# Patient Record
Sex: FEMALE | Race: WHITE | URBAN - METROPOLITAN AREA
[De-identification: names, ages, dates, MRNs, and addresses within clinical notes are randomized per-mention and may not be internally consistent; named-entity substitution may affect disease eponyms.]

---

## 2017-08-18 ENCOUNTER — EMERGENCY (EMERGENCY)
Facility: HOSPITAL | Age: 27
LOS: 1 days | End: 2017-08-18

## 2018-02-26 ENCOUNTER — EMERGENCY (EMERGENCY)
Facility: HOSPITAL | Age: 28
LOS: 1 days | Discharge: HOME | End: 2018-02-26
Attending: EMERGENCY MEDICINE | Admitting: EMERGENCY MEDICINE
Payer: COMMERCIAL

## 2018-02-26 VITALS
WEIGHT: 130.07 LBS | HEART RATE: 94 BPM | SYSTOLIC BLOOD PRESSURE: 132 MMHG | HEIGHT: 62 IN | TEMPERATURE: 99 F | DIASTOLIC BLOOD PRESSURE: 92 MMHG | RESPIRATION RATE: 16 BRPM | OXYGEN SATURATION: 100 %

## 2018-02-26 PROCEDURE — 70450 CT HEAD/BRAIN W/O DYE: CPT | Mod: 26

## 2018-02-26 PROCEDURE — 99284 EMERGENCY DEPT VISIT MOD MDM: CPT | Mod: 25

## 2018-02-26 PROCEDURE — 70450 CT HEAD/BRAIN W/O DYE: CPT

## 2018-02-26 PROCEDURE — 99284 EMERGENCY DEPT VISIT MOD MDM: CPT

## 2018-02-26 PROCEDURE — 72125 CT NECK SPINE W/O DYE: CPT

## 2018-02-26 PROCEDURE — 72125 CT NECK SPINE W/O DYE: CPT | Mod: 26

## 2018-02-26 RX ORDER — METHOCARBAMOL 500 MG/1
2 TABLET, FILM COATED ORAL
Qty: 30 | Refills: 0 | OUTPATIENT
Start: 2018-02-26 | End: 2018-03-02

## 2018-02-26 RX ORDER — ACETAMINOPHEN 500 MG
650 TABLET ORAL ONCE
Qty: 0 | Refills: 0 | Status: COMPLETED | OUTPATIENT
Start: 2018-02-26 | End: 2018-02-26

## 2018-02-26 RX ORDER — IBUPROFEN 200 MG
600 TABLET ORAL ONCE
Qty: 0 | Refills: 0 | Status: COMPLETED | OUTPATIENT
Start: 2018-02-26 | End: 2018-02-26

## 2018-02-26 RX ORDER — ONDANSETRON 8 MG/1
4 TABLET, FILM COATED ORAL ONCE
Qty: 0 | Refills: 0 | Status: COMPLETED | OUTPATIENT
Start: 2018-02-26 | End: 2018-02-26

## 2018-02-26 RX ORDER — METHOCARBAMOL 500 MG/1
1500 TABLET, FILM COATED ORAL ONCE
Qty: 0 | Refills: 0 | Status: COMPLETED | OUTPATIENT
Start: 2018-02-26 | End: 2018-02-26

## 2018-02-26 RX ORDER — ONDANSETRON 8 MG/1
1 TABLET, FILM COATED ORAL
Qty: 6 | Refills: 0 | OUTPATIENT
Start: 2018-02-26 | End: 2018-02-27

## 2018-02-26 RX ORDER — IBUPROFEN 200 MG
1 TABLET ORAL
Qty: 20 | Refills: 0 | OUTPATIENT
Start: 2018-02-26 | End: 2018-03-02

## 2018-02-26 RX ADMIN — ONDANSETRON 4 MILLIGRAM(S): 8 TABLET, FILM COATED ORAL at 17:15

## 2018-02-26 RX ADMIN — Medication 600 MILLIGRAM(S): at 16:45

## 2018-02-26 RX ADMIN — Medication 650 MILLIGRAM(S): at 17:30

## 2018-02-26 RX ADMIN — METHOCARBAMOL 1500 MILLIGRAM(S): 500 TABLET, FILM COATED ORAL at 17:32
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
## 2018-02-26 NOTE — ED PROVIDER NOTE - PROGRESS NOTE DETAILS
pt improved. ct results reviewed with pt including abnormal. pt given a copy of results. will rx robaxin, ibuprofen and zofran. pt advised on risk of concussion and advised her not to engage in sports/ gym until she follows up with pmd. will refer to spine center and concussion program. pt given head injury precautions. All questions answered and concerns addressed. pt verbalized understanding and agreement with plan and dx. pt advised to follow up with PMD. pt advised to return to ed for worsening symptoms including fever, cp, sob. will dc.
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
## 2019-01-29 ENCOUNTER — EMERGENCY (EMERGENCY)
Facility: HOSPITAL | Age: 29
LOS: 1 days | End: 2019-01-29
Payer: COMMERCIAL

## 2019-01-29 PROCEDURE — 99285 EMERGENCY DEPT VISIT HI MDM: CPT

## 2019-01-29 PROCEDURE — 93970 EXTREMITY STUDY: CPT | Mod: 26
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
## 2024-08-13 NOTE — ED ADULT NURSE NOTE - PAIN: BODY LOCATION
What Type Of Note Output Would You Prefer (Optional)?: Standard Output Hpi Title: Evaluation of Skin Lesions How Severe Are Your Spot(S)?: moderate Have Your Spot(S) Been Treated In The Past?: has not been treated normal... neck

## 2025-05-06 ENCOUNTER — EMERGENCY (EMERGENCY)
Facility: HOSPITAL | Age: 35
LOS: 1 days | End: 2025-05-06
Attending: EMERGENCY MEDICINE
Payer: MEDICARE

## 2025-05-06 VITALS
SYSTOLIC BLOOD PRESSURE: 140 MMHG | OXYGEN SATURATION: 98 % | WEIGHT: 114.64 LBS | RESPIRATION RATE: 20 BRPM | HEART RATE: 58 BPM | HEIGHT: 63 IN | DIASTOLIC BLOOD PRESSURE: 115 MMHG | TEMPERATURE: 98 F

## 2025-05-06 LAB
ALBUMIN SERPL ELPH-MCNC: 4 G/DL — SIGNIFICANT CHANGE UP (ref 3.3–5.2)
ALP SERPL-CCNC: 51 U/L — SIGNIFICANT CHANGE UP (ref 40–120)
ALT FLD-CCNC: 21 U/L — SIGNIFICANT CHANGE UP
AMPHET UR-MCNC: POSITIVE
ANION GAP SERPL CALC-SCNC: 15 MMOL/L — SIGNIFICANT CHANGE UP (ref 5–17)
APAP SERPL-MCNC: <3 UG/ML — LOW (ref 10–26)
APPEARANCE UR: CLEAR — SIGNIFICANT CHANGE UP
AST SERPL-CCNC: 17 U/L — SIGNIFICANT CHANGE UP
BACTERIA # UR AUTO: NEGATIVE /HPF — SIGNIFICANT CHANGE UP
BARBITURATES UR SCN-MCNC: NEGATIVE — SIGNIFICANT CHANGE UP
BENZODIAZ UR-MCNC: POSITIVE
BILIRUB SERPL-MCNC: 0.4 MG/DL — SIGNIFICANT CHANGE UP (ref 0.4–2)
BILIRUB UR-MCNC: NEGATIVE — SIGNIFICANT CHANGE UP
BUN SERPL-MCNC: 10.9 MG/DL — SIGNIFICANT CHANGE UP (ref 8–20)
CALCIUM SERPL-MCNC: 9 MG/DL — SIGNIFICANT CHANGE UP (ref 8.4–10.5)
CAST: 1 /LPF — SIGNIFICANT CHANGE UP (ref 0–4)
CHLORIDE SERPL-SCNC: 99 MMOL/L — SIGNIFICANT CHANGE UP (ref 96–108)
CO2 SERPL-SCNC: 21 MMOL/L — LOW (ref 22–29)
COCAINE METAB.OTHER UR-MCNC: NEGATIVE — SIGNIFICANT CHANGE UP
COLOR SPEC: YELLOW — SIGNIFICANT CHANGE UP
CREAT SERPL-MCNC: 0.61 MG/DL — SIGNIFICANT CHANGE UP (ref 0.5–1.3)
DIFF PNL FLD: NEGATIVE — SIGNIFICANT CHANGE UP
EGFR: 119 ML/MIN/1.73M2 — SIGNIFICANT CHANGE UP
EGFR: 119 ML/MIN/1.73M2 — SIGNIFICANT CHANGE UP
ETHANOL SERPL-MCNC: <10 MG/DL — SIGNIFICANT CHANGE UP (ref 0–9)
FENTANYL UR QL SCN: NEGATIVE — SIGNIFICANT CHANGE UP
GLUCOSE SERPL-MCNC: 93 MG/DL — SIGNIFICANT CHANGE UP (ref 70–99)
GLUCOSE UR QL: NEGATIVE MG/DL — SIGNIFICANT CHANGE UP
KETONES UR-MCNC: ABNORMAL MG/DL
LEUKOCYTE ESTERASE UR-ACNC: NEGATIVE — SIGNIFICANT CHANGE UP
METHADONE UR-MCNC: NEGATIVE — SIGNIFICANT CHANGE UP
NITRITE UR-MCNC: NEGATIVE — SIGNIFICANT CHANGE UP
OPIATES UR-MCNC: NEGATIVE — SIGNIFICANT CHANGE UP
PCP SPEC-MCNC: SIGNIFICANT CHANGE UP
PCP UR-MCNC: NEGATIVE — SIGNIFICANT CHANGE UP
PH UR: 6 — SIGNIFICANT CHANGE UP (ref 5–8)
POTASSIUM SERPL-MCNC: 4.2 MMOL/L — SIGNIFICANT CHANGE UP (ref 3.5–5.3)
POTASSIUM SERPL-SCNC: 4.2 MMOL/L — SIGNIFICANT CHANGE UP (ref 3.5–5.3)
PROT SERPL-MCNC: 6.5 G/DL — LOW (ref 6.6–8.7)
PROT UR-MCNC: SIGNIFICANT CHANGE UP MG/DL
RBC CASTS # UR COMP ASSIST: 1 /HPF — SIGNIFICANT CHANGE UP (ref 0–4)
SALICYLATES SERPL-MCNC: <0.6 MG/DL — LOW (ref 10–20)
SODIUM SERPL-SCNC: 135 MMOL/L — SIGNIFICANT CHANGE UP (ref 135–145)
SP GR SPEC: 1.03 — SIGNIFICANT CHANGE UP (ref 1–1.03)
SQUAMOUS # UR AUTO: 5 /HPF — SIGNIFICANT CHANGE UP (ref 0–5)
THC UR QL: POSITIVE
UROBILINOGEN FLD QL: 1 MG/DL — SIGNIFICANT CHANGE UP (ref 0.2–1)
WBC UR QL: 7 /HPF — HIGH (ref 0–5)

## 2025-05-06 PROCEDURE — 99285 EMERGENCY DEPT VISIT HI MDM: CPT

## 2025-05-06 PROCEDURE — 93010 ELECTROCARDIOGRAM REPORT: CPT

## 2025-05-06 NOTE — ED PROVIDER NOTE - OBJECTIVE STATEMENT
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  Patient states she recently had a child taken away by CPS and was recently incarcerated which is causing her a lot of distress.  Patient states she presents to the ED today because she feels hopeless and needs help.  Patient denies having suicidal or homicidal ideations.  Patient denies hallucinations.  Patient is endorsing no other symptoms at this time. Patient denies SOB, chest pain, fever, chills, nausea, vomiting, diarrhea, constipation, headache, weakness, dizziness, dysuria, hematuria.

## 2025-05-06 NOTE — ED PROVIDER NOTE - ATTENDING CONTRIBUTION TO CARE
34y F w/ hx anxiety/depression, ADHD, melanoma s/p resection; presents for psych eval. Pt says she was recently incarcerated and had her child taken away by CPS. Says she is feeling depressed and wants help. Denies SI/HI. Also reports daily alcohol use; expresses interest in detox. Takes prescribed klonopin and adderall. On my exam pt in no acute distress, no tremors noted. Heart RRR, lungs CTAB. Will medically clear for psych eval.

## 2025-05-06 NOTE — ED PROVIDER NOTE - PHYSICAL EXAMINATION
General: Awake, alert, lying in bed  HEENT: Normocephalic, atraumatic. No scleral icterus or conjunctival injection. EOMI. Pupils 2mm b/l and reactive  Cardiac: RRR, S1/S2 present  Resp: Lungs CTAB. Symmetric chest expansion with inspiration. No accessory muscle use  Abd: Soft, non-tender, non-distended. No guarding, rebound, or rigidity.  Skin: No rashes, abrasions, or lacerations.  Extremities: Palpable DP pulses bilaterally. No LE edema.  Neuro: AO x 4. Moves all extremities symmetrically. Motor strength and sensation grossly intact.  Psych: depressed affect

## 2025-05-06 NOTE — ED PROVIDER NOTE - CLINICAL SUMMARY MEDICAL DECISION MAKING FREE TEXT BOX
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 Pending labs and UA.  Psychiatry consulted for evaluation.

## 2025-05-06 NOTE — ED PROVIDER NOTE - NSICDXPASTMEDICALHX_GEN_ALL_CORE_FT
PAST MEDICAL HISTORY:  ADHD     Anxiety     Major depression     Melanoma with mets to the brain

## 2025-05-06 NOTE — ED ADULT TRIAGE NOTE - CHIEF COMPLAINT QUOTE
Pt arrives to ED c/o alcohol and psych meds withdrawal  - last dose taken yesterday , last drink last night

## 2025-05-07 VITALS
HEART RATE: 63 BPM | SYSTOLIC BLOOD PRESSURE: 105 MMHG | TEMPERATURE: 98 F | RESPIRATION RATE: 19 BRPM | DIASTOLIC BLOOD PRESSURE: 64 MMHG | OXYGEN SATURATION: 98 %

## 2025-05-07 DIAGNOSIS — F32.A DEPRESSION, UNSPECIFIED: ICD-10-CM

## 2025-05-07 DIAGNOSIS — Z98.890 OTHER SPECIFIED POSTPROCEDURAL STATES: Chronic | ICD-10-CM

## 2025-05-07 LAB
BASOPHILS # BLD AUTO: 0.04 K/UL — SIGNIFICANT CHANGE UP (ref 0–0.2)
BASOPHILS NFR BLD AUTO: 0.6 % — SIGNIFICANT CHANGE UP (ref 0–2)
EOSINOPHIL # BLD AUTO: 0.07 K/UL — SIGNIFICANT CHANGE UP (ref 0–0.5)
EOSINOPHIL NFR BLD AUTO: 1 % — SIGNIFICANT CHANGE UP (ref 0–6)
FLUAV AG NPH QL: SIGNIFICANT CHANGE UP
FLUBV AG NPH QL: SIGNIFICANT CHANGE UP
HCT VFR BLD CALC: 34.8 % — SIGNIFICANT CHANGE UP (ref 34.5–45)
HGB BLD-MCNC: 11.6 G/DL — SIGNIFICANT CHANGE UP (ref 11.5–15.5)
IMM GRANULOCYTES # BLD AUTO: 0.01 K/UL — SIGNIFICANT CHANGE UP (ref 0–0.07)
IMM GRANULOCYTES NFR BLD AUTO: 0.1 % — SIGNIFICANT CHANGE UP (ref 0–0.9)
LYMPHOCYTES # BLD AUTO: 3.02 K/UL — SIGNIFICANT CHANGE UP (ref 1–3.3)
LYMPHOCYTES NFR BLD AUTO: 42.4 % — SIGNIFICANT CHANGE UP (ref 13–44)
MCHC RBC-ENTMCNC: 28.5 PG — SIGNIFICANT CHANGE UP (ref 27–34)
MCHC RBC-ENTMCNC: 33.3 G/DL — SIGNIFICANT CHANGE UP (ref 32–36)
MCV RBC AUTO: 85.5 FL — SIGNIFICANT CHANGE UP (ref 80–100)
MONOCYTES # BLD AUTO: 0.52 K/UL — SIGNIFICANT CHANGE UP (ref 0–0.9)
MONOCYTES NFR BLD AUTO: 7.3 % — SIGNIFICANT CHANGE UP (ref 2–14)
NEUTROPHILS # BLD AUTO: 3.47 K/UL — SIGNIFICANT CHANGE UP (ref 1.8–7.4)
NEUTROPHILS NFR BLD AUTO: 48.6 % — SIGNIFICANT CHANGE UP (ref 43–77)
NRBC # BLD AUTO: 0 K/UL — SIGNIFICANT CHANGE UP (ref 0–0)
NRBC # FLD: 0 K/UL — SIGNIFICANT CHANGE UP (ref 0–0)
NRBC BLD AUTO-RTO: 0 /100 WBCS — SIGNIFICANT CHANGE UP (ref 0–0)
PLATELET # BLD AUTO: 212 K/UL — SIGNIFICANT CHANGE UP (ref 150–400)
PMV BLD: 11 FL — SIGNIFICANT CHANGE UP (ref 7–13)
RBC # BLD: 4.07 M/UL — SIGNIFICANT CHANGE UP (ref 3.8–5.2)
RBC # FLD: 13.2 % — SIGNIFICANT CHANGE UP (ref 10.3–14.5)
RSV RNA NPH QL NAA+NON-PROBE: SIGNIFICANT CHANGE UP
SARS-COV-2 RNA SPEC QL NAA+PROBE: SIGNIFICANT CHANGE UP
SOURCE RESPIRATORY: SIGNIFICANT CHANGE UP
WBC # BLD: 7.13 K/UL — SIGNIFICANT CHANGE UP (ref 3.8–10.5)
WBC # FLD AUTO: 7.13 K/UL — SIGNIFICANT CHANGE UP (ref 3.8–10.5)

## 2025-05-07 PROCEDURE — G0378: CPT

## 2025-05-07 PROCEDURE — 87086 URINE CULTURE/COLONY COUNT: CPT

## 2025-05-07 PROCEDURE — 80307 DRUG TEST PRSMV CHEM ANLYZR: CPT

## 2025-05-07 PROCEDURE — 81001 URINALYSIS AUTO W/SCOPE: CPT

## 2025-05-07 PROCEDURE — 85025 COMPLETE CBC W/AUTO DIFF WBC: CPT

## 2025-05-07 PROCEDURE — 99236 HOSP IP/OBS SAME DATE HI 85: CPT

## 2025-05-07 PROCEDURE — 87637 SARSCOV2&INF A&B&RSV AMP PRB: CPT

## 2025-05-07 PROCEDURE — 90792 PSYCH DIAG EVAL W/MED SRVCS: CPT | Mod: 95

## 2025-05-07 PROCEDURE — 80053 COMPREHEN METABOLIC PANEL: CPT

## 2025-05-07 PROCEDURE — 36415 COLL VENOUS BLD VENIPUNCTURE: CPT

## 2025-05-07 PROCEDURE — 99284 EMERGENCY DEPT VISIT MOD MDM: CPT | Mod: 25

## 2025-05-07 PROCEDURE — 93005 ELECTROCARDIOGRAM TRACING: CPT

## 2025-05-07 RX ORDER — CLONAZEPAM 0.5 MG/1
1 TABLET ORAL
Qty: 6 | Refills: 0
Start: 2025-05-07 | End: 2025-05-09

## 2025-05-07 RX ORDER — DEXTROAMPHETAMINE SACCHARATE, AMPHETAMINE ASPARTATE MONOHYDRATE, DEXTROAMPHETAMINE SULFATE AND AMPHETAMINE SULFATE 2.5; 2.5; 2.5; 2.5 MG/1; MG/1; MG/1; MG/1
1 CAPSULE, EXTENDED RELEASE ORAL
Qty: 3 | Refills: 0
Start: 2025-05-07 | End: 2025-05-09

## 2025-05-07 RX ORDER — ESCITALOPRAM OXALATE 20 MG/1
1 TABLET ORAL
Qty: 5 | Refills: 0
Start: 2025-05-07 | End: 2025-05-11

## 2025-05-07 RX ORDER — ESCITALOPRAM OXALATE 20 MG/1
20 TABLET ORAL
Refills: 0 | Status: DISCONTINUED | OUTPATIENT
Start: 2025-05-07 | End: 2025-05-14

## 2025-05-07 RX ORDER — NITROFURANTOIN MACROCRYSTAL 100 MG
1 CAPSULE ORAL
Qty: 10 | Refills: 0
Start: 2025-05-07 | End: 2025-05-11

## 2025-05-07 RX ORDER — DEXTROAMPHETAMINE SACCHARATE, AMPHETAMINE ASPARTATE MONOHYDRATE, DEXTROAMPHETAMINE SULFATE AND AMPHETAMINE SULFATE 2.5; 2.5; 2.5; 2.5 MG/1; MG/1; MG/1; MG/1
20 CAPSULE, EXTENDED RELEASE ORAL
Refills: 0 | Status: COMPLETED | OUTPATIENT
Start: 2025-05-07 | End: 2025-05-14

## 2025-05-07 RX ORDER — CLONAZEPAM 0.5 MG/1
1.5 TABLET ORAL ONCE
Refills: 0 | Status: DISCONTINUED | OUTPATIENT
Start: 2025-05-07 | End: 2025-05-07

## 2025-05-07 RX ORDER — DEXTROAMPHETAMINE SACCHARATE, AMPHETAMINE ASPARTATE MONOHYDRATE, DEXTROAMPHETAMINE SULFATE AND AMPHETAMINE SULFATE 2.5; 2.5; 2.5; 2.5 MG/1; MG/1; MG/1; MG/1
20 CAPSULE, EXTENDED RELEASE ORAL ONCE
Refills: 0 | Status: DISCONTINUED | OUTPATIENT
Start: 2025-05-07 | End: 2025-05-07

## 2025-05-07 RX ORDER — ACETAMINOPHEN 500 MG/5ML
975 LIQUID (ML) ORAL ONCE
Refills: 0 | Status: COMPLETED | OUTPATIENT
Start: 2025-05-07 | End: 2025-05-07

## 2025-05-07 RX ORDER — ESCITALOPRAM OXALATE 20 MG/1
20 TABLET ORAL ONCE
Refills: 0 | Status: COMPLETED | OUTPATIENT
Start: 2025-05-07 | End: 2025-05-07

## 2025-05-07 RX ORDER — LOPERAMIDE HCL 1 MG/7.5ML
2 SOLUTION ORAL DAILY
Refills: 0 | Status: DISCONTINUED | OUTPATIENT
Start: 2025-05-07 | End: 2025-05-14

## 2025-05-07 RX ORDER — CLONAZEPAM 0.5 MG/1
1.5 TABLET ORAL
Refills: 0 | Status: COMPLETED | OUTPATIENT
Start: 2025-05-07 | End: 2025-05-14

## 2025-05-07 RX ADMIN — CLONAZEPAM 1.5 MILLIGRAM(S): 0.5 TABLET ORAL at 18:02

## 2025-05-07 RX ADMIN — DEXTROAMPHETAMINE SACCHARATE, AMPHETAMINE ASPARTATE MONOHYDRATE, DEXTROAMPHETAMINE SULFATE AND AMPHETAMINE SULFATE 20 MILLIGRAM(S): 2.5; 2.5; 2.5; 2.5 CAPSULE, EXTENDED RELEASE ORAL at 18:02

## 2025-05-07 RX ADMIN — Medication 975 MILLIGRAM(S): at 04:19

## 2025-05-07 RX ADMIN — ESCITALOPRAM OXALATE 20 MILLIGRAM(S): 20 TABLET ORAL at 00:44

## 2025-05-07 RX ADMIN — CLONAZEPAM 1.5 MILLIGRAM(S): 0.5 TABLET ORAL at 06:00

## 2025-05-07 RX ADMIN — DEXTROAMPHETAMINE SACCHARATE, AMPHETAMINE ASPARTATE MONOHYDRATE, DEXTROAMPHETAMINE SULFATE AND AMPHETAMINE SULFATE 20 MILLIGRAM(S): 2.5; 2.5; 2.5; 2.5 CAPSULE, EXTENDED RELEASE ORAL at 06:00

## 2025-05-07 RX ADMIN — Medication 975 MILLIGRAM(S): at 06:34

## 2025-05-07 RX ADMIN — ESCITALOPRAM OXALATE 20 MILLIGRAM(S): 20 TABLET ORAL at 08:15

## 2025-05-07 RX ADMIN — Medication 975 MILLIGRAM(S): at 11:08

## 2025-05-07 RX ADMIN — CLONAZEPAM 1.5 MILLIGRAM(S): 0.5 TABLET ORAL at 00:44

## 2025-05-07 NOTE — ED CDU PROVIDER INITIAL DAY NOTE - CLINICAL SUMMARY MEDICAL DECISION MAKING FREE TEXT BOX
34-year-old female with PMH of anxiety, depression, ADHD, alcohol abuse, stage IV melanoma with mets to the brain s/p brain resection x 2 presents to the ED with depressive episode. Medically cleared. Psychiatry consulted for evaluation -- cleared for discharge. Pt requesting to speak with social work regarding housing and possible detox placement. Will monitor or CIWA protocol.

## 2025-05-07 NOTE — ED BEHAVIORAL HEALTH ASSESSMENT NOTE - HPI (INCLUDE ILLNESS QUALITY, SEVERITY, DURATION, TIMING, CONTEXT, MODIFYING FACTORS, ASSOCIATED SIGNS AND SYMPTOMS)
Adderall 40 mg, Klonopin 1 mg TID    No records found in PSYCKES. Pt is a 33 yo F, single, previously living with parents, but was asked to leave last night after violating a stay away OOP parents have against her, has 15 mo son who's currently in the custody of her parents, unemployed, on SSDI, PMH significant for stage IV melanoma with mets to the brain s/p brain resection x 2 and resultant seizure disorder, with PPHx MDD vs Bipolar Disorder, ADHD, denies prior psych admissions, denies pSA, remote hx of SIB by cutting, denies recent physical aggression, +hx of alcohol misuse(states last drink was 1-2 days ago, +hx of MJ use, not connected to outpt psych care, but is prescribed Lexapro 20 mg, Adderall 40 mg, and Klonopin 1 mg TID by her oncologist, who self presents to the ED for depression and requesting connection to rehab.     On assessment, the pt is A&O x4, presents as c    No records found in PSYCKES. Pt is a 33 yo F, single, previously living with parents, but was asked to leave last night after violating a stay away OOP parents have against her, has 15 mo son who's currently in the custody of her parents, unemployed, on SSDI, PMH significant for stage IV melanoma with mets to the brain s/p brain resection x 2 and resultant seizure disorder, with PPHx MDD vs Bipolar Disorder, ADHD, denies prior psych admissions, denies pSA, remote hx of SIB by cutting, denies recent physical aggression, +hx of alcohol misuse(states last drink was 1-2 days ago, +hx of MJ use, not connected to outpt psych care, but is prescribed Lexapro 20 mg, Adderall 40 mg, and Klonopin 1 mg TID by her oncologist, who self presents to the ED for depression and requesting connection to rehab.     On assessment, the pt is A&O x4, presents as hyperverbal, circumstantial, and future-oriented, with no e/o internal preoccupation or agitation, and states she came to the ED in search of inpt detox/rehab for her alcohol misuse and wants to speak with a psychiatrist because she's been feeling depressed. The pt states she's been feeling depressed for months in the context of living with her parents, with whom she has a contentious relationship. However, she states she's been acutely distressed over the last 1-2 days due to recent events, including violating her parents' Stay Away OOP by arriving home intoxicated, being placed under arrest and spending the night in correction last night, and then transferring the custody of her son temporarily over to her parents. The pt states she now has no where to stay as she can't return to her parents' home and decided to come to the ED to receive assistance. The pt states that over the last 1-2 days she's felt more depressed and anxious as she does not want to lose permanent custody of her child, has had disturbed sleep, and feels hopeless. However, she adamantly denies recent SI with intent or plan and cites her child as her main reason to live. The pt states she now wants to enter inpt detox/rehab to address her alcohol use and to be connected with outpt psychiatric care for psychotherapy and med management. The pt otherwise does not report current or recent HI, A/VH, PI, denies recent physical aggression or SIB, and denies access to firearms.     No records found in PSYCKES. 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     On assessment, the pt is A&O x4, presents as hyperverbal, circumstantial, and future-oriented, with no e/o internal preoccupation or agitation, and states she came to the ED in search of inpt detox/rehab for her alcohol misuse and wants to speak with a psychiatrist because she's been feeling depressed. The pt states she's been feeling depressed for months in the context of living with her parents, with whom she has a contentious relationship. However, she states she's been acutely distressed over the last 1-2 days due to recent events, including violating her parents' Stay Away OOP by arriving home intoxicated, being placed under arrest and spending the night in long-term last night, and then transferring the custody of her son temporarily over to her parents. The pt states she now has no where to stay as she can't return to her parents' home and decided to come to the ED to receive assistance. The pt states that over the last 1-2 days she's felt more depressed and anxious as she does not want to lose permanent custody of her child, has had disturbed sleep, and feels hopeless. However, she adamantly denies recent SI with intent or plan and cites her child as her main reason to live. The pt states she now wants to enter inpt detox/rehab to address her alcohol use and to be connected with outpt psychiatric care for psychotherapy and med management. The pt otherwise does not report current or recent HI, A/VH, PI, denies recent physical aggression or SIB, and denies access to firearms.     Writer called and left a VM for pt's mother, Chayo Szymanski([phone]), requesting she call back for additional collateral.    No records found in PSYCKES. Pt is a 33 yo F, single, previously living with parents, but was asked to leave last night after violating a stay away OOP parents have against her, has 15 mo son who's currently in the custody of her parents, unemployed, on SSDI, PMH significant for stage IV melanoma with mets to the brain s/p brain resection x 2 and resultant seizure disorder, with PPHx MDD vs Bipolar Disorder, ADHD, denies prior psych admissions, denies pSA, remote hx of SIB by cutting, denies recent physical aggression, +hx of alcohol misuse(states last drink was 1-2 days ago, +hx of MJ use, not connected to outpt psych care, but is prescribed Lexapro 20 mg, Adderall 40 mg, and Klonopin 1 mg TID by her oncologist, who self presents to the ED for depression and requesting connection to rehab.     On assessment, the pt is A&O x4, presents as euthymic, circumstantial, and future-oriented, with no e/o internal preoccupation or agitation, and states she came to the ED in search of inpt detox/rehab for her alcohol misuse and wants to speak with a psychiatrist because she's been feeling depressed. The pt states she's been feeling depressed for months in the context of living with her parents, with whom she has a contentious relationship. However, she states she's been acutely distressed over the last 1-2 days due to recent events, including violating her parents' Stay Away OOP by arriving home intoxicated, being placed under arrest and spending the night in long term last night, and then transferring the custody of her son temporarily over to her parents. The pt states she now has no where to stay as she can't return to her parents' home and decided to come to the ED to receive assistance. The pt states that over the last 1-2 days she's felt more depressed and anxious as she does not want to lose permanent custody of her child, has had disturbed sleep, and feels hopeless. However, she adamantly denies recent SI with intent or plan and cites her child as her main reason to live. The pt states she now wants to enter inpt detox/rehab to address her alcohol use and to be connected with outpt psychiatric care for psychotherapy and med management. The pt otherwise does not report current or recent HI, A/VH, PI, denies recent physical aggression or SIB, and denies access to firearms.     Writer called and left a VM for pt's mother, Chayo Szymanski([phone]), requesting she call back for additional collateral.    No records found in PSYCKES.

## 2025-05-07 NOTE — ED BEHAVIORAL HEALTH ASSESSMENT NOTE - NS ED BHA DEMOGRAPHICS MEDICAL RECORD REVIEWED CONSENT OBTAINED YN
[facility]  Interdisciplinary Discharge Instructions    Date of Discharge: 11/26/19   Ambulatory: Yes  Time of Discharge: Pending   Transportation: Son  Residence Upon Discharge: Home           Sharps / Valuables Returned: Yes    Discharge Medications: Yes     Discharge Prescriptions: Yes     Instructions given by: RN  Patients's own medication returned: N/A            Written educational materials given: Yes  Satisfaction survey completed: Unknown              I have received instruction in these areas and have had an opportunity to ask questions, understand what has been discussed, and have received a copy of this form.        For therapeutic reasons of current patients and for your continued recovery, visitation is not allowed on this unit for thirty days.      _____________________________________ ________________  Signature of Patient     Date/Time      _____________________________________ ________________  Signature of POA/Guardian/Parent              Date/Time      _____________________________________ ________________  Signature of Caregiver    Date/Time          Medication Drop Box Locations - NO NEEDLES IN MEDICATION DROP BOXES    *Liquid Medications must be in ORIGINAL bottles and securely closed. Please place the   bottle in a sealed plastic bag before depositing it in the box.    *It is preferred that medications remain in their original packaging or prescription bottle.    Cross out your name & address, but DO NOT cover up the name of the medication.      [place] [employer]  [address] [facility] [address] [facility]. [phone]    [place] [employer] [address].  [phone]    [place] [employer] [address] [facility]. [phone]    [place] [employer] [address]. [phone]    [place] [employer] [address] [facility]. [phone]      Where to dispose needles?    [facility]'s  Emergency Department in [place] maintains a free drop off box for needles.  Contaminated sharps must be placed in an approved red, plastic, puncture-proof container or a used plastic laundry detergent bottle labeled \"sharps\" - No larger than 11 x 7.5 inches.       Yes

## 2025-05-07 NOTE — CHART NOTE - NSCHARTNOTEFT_GEN_A_CORE
SWNote: worker spoke with registration staff informed pt states she has  medical insurance ([employer] first ). Staff re interviewed pt , no active insurance at this time. Worker informed SOH (Yen) ,pt will be under self pay (~$550 per day)  . Yumiko: worker spoke with registration staff informed pt states she has  medical insurance ([employer] first ). Staff re interviewed pt , no active insurance at this time. Worker informed [facility]OH (Yen) ,pt will be under self pay (~$550 per day)  . Worker called [facility] no availability , [facility] can accommodate pt needs phone interview. Worker informed pt states she has been at [facility] and "it's a mess" declined to do phone interview. Worker thanked  Sveta informed pt declined service, understood.  Worker offered written resources to pt and to call DSS after hours , pt not sure about DSS either. Requested worker if she could call her mother to inform her about inactive insurance , informed she can speak with her mother  if she wants ,ED phone offered. Pt requested to speak with MD feeling sick and wants medicine. Dr Viveros made aware of the above,will meet with pt . Written resources given to pt (COS, SBIRT /connect).

## 2025-05-07 NOTE — SBIRT NOTE ADULT - NSSBIRTBRIEFINTDET_GEN_A_CORE
results discussed with pt , harmful side effects discussed .Pt aware and receptive to referral to program . Pt interested in info to f/u on her own .

## 2025-05-07 NOTE — ED ADULT NURSE REASSESSMENT NOTE - NS ED NURSE REASSESS COMMENT FT1
Pt's cash returned to pt from security. Pt counted money in front of RN and confirmed amount was correct.

## 2025-05-07 NOTE — CHART NOTE - NSCHARTNOTEFT_GEN_A_CORE
SALVADORNote: worker met with pt , informed about  services ,verbalized understanding. Encouraged to share her substance use . Reportedly , pt drinks 2-3  beers (22oz cans) daily . Pt denies any other substance use . States she can stop on her own, denies drinking more than 3 beers sometimes less ,Pt feels she has her drinking under control "enjoy the Budwe[employer]'s beer  flavor but not to get drunk" ..Pt unsure if she wants inpatient program or outpatient .Worker to call [facility] to obtain info about possible bed availability.

## 2025-05-07 NOTE — SBIRT NOTE ADULT - NSSBIRTDRGPOSREINDET_GEN_A_CORE
Patient requesting results.  Please advise.   encouraged to refrain from illegal drug use ,receptive.

## 2025-05-07 NOTE — ED BEHAVIORAL HEALTH ASSESSMENT NOTE - LEGAL HISTORY
Parents have Stay Away OOP against her in place, which she violated 2 nights ago when she arrived home intoxicated and was subsequently arrested.

## 2025-05-07 NOTE — ED BEHAVIORAL HEALTH ASSESSMENT NOTE - NSSUICPROTFACT_PSY_ALL_CORE
help-seeking/Responsibility to children, family, or others/Identifies reasons for living/Fear of death or the actual act of killing self

## 2025-05-07 NOTE — ED CDU PROVIDER DISPOSITION NOTE - PATIENT PORTAL LINK FT
You can access the FollowMyHealth Patient Portal offered by [facility] by registering at the following website: [email or web address]. By joining [employer]’s FollowMyHealth portal, you will also be able to view your health information using other applications (apps) compatible with our system.

## 2025-05-07 NOTE — ED CDU PROVIDER DISPOSITION NOTE - CLINICAL COURSE
pt with no sign of withdrawal, does not meet criteria for inpt detox. Accepted for rehab at [facility] but declines. SW then offered emergency housing and she declined and wishes to stay with friend, medically stable for dc, states she does not have her meds as at her parents and they wont let her back in, reliable on [facility] drug registry, will give 3 days refill

## 2025-05-07 NOTE — ED BEHAVIORAL HEALTH ASSESSMENT NOTE - DESCRIPTION
See ED documentation    [employer] checked(Reference #: [id number]):       PDI	My Rx	Current Rx	Drug Type	Rx Written	Rx Dispensed	Drug	Quantity	Days Supply	Prescriber Name	Prescriber RHYS #	Payment Method	Dispenser  B	N	Y	S	04/24/2025	04/24/2025	dextroamp-amphetamin 20 mg tab	60	30	Fatou Putnam	[id number]	Medicare	[id number]  B	N	Y	B	04/22/2025	04/22/2025	clonazepam 1 mg tablet	90	30	Dillon Dickinson MD	[id number]	Medicare	[id number]  B	N	N	S	03/24/2025	03/24/2025	dextroamp-amphetamin 20 mg tab	60	30	Lucia Orosco	[id number]	Medicare	[id number]  B	N	N	B	03/21/2025	03/22/2025	clonazepam 1 mg tablet	90	30	Dillon Dickinson MD	[id number]	Medicare	[id number]  B	N	N	S	02/24/2025	02/24/2025	dextroamp-amphetamin 20 mg tab	60	30	Fatou Putnam	[id number]	Medicare	[id number]  B	N	N	B	02/20/2025	02/21/2025	clonazepam 1 mg tablet	90	30	Dillon Dickinson MD	[id number]	Medicare	[id number]  B	N	N	O	01/31/2025	02/03/2025	diphenoxylate-atropine 2.5-0.025 mg tablet	40	10	Jannet Richmond	[id number]	Medicare	[id number]  B	N	N	S	01/23/2025	01/24/2025	dextroamp-amphetamin 20 mg tab	60	30	Dillon Dickinson MD	[id number]	Medicare	[id number]  B	N	N	B	01/20/2025	01/20/2025	clonazepam 1 mg tablet	90	30	Dillon Dickinson MD	[id number]	Medicare	[id number]  B	N	N	S	12/23/2024	12/23/2024	dextroamp-amphetamin 20 mg tab	60	30	Rashad Gurrola	[id number]	Medicare	[id number]  B	N	N	B	12/18/2024	12/18/2024	clonazepam 1 mg tablet	90	30	Shanthi Calix	[id number]	Medicare	[id number] See HPI

## 2025-05-07 NOTE — ED BEHAVIORAL HEALTH ASSESSMENT NOTE - DETAILS
See HPI Deferred As above CPS recently placed pt's child into her parents' custody Self referred Pt contracted for safety

## 2025-05-07 NOTE — ED ADULT NURSE NOTE - OBJECTIVE STATEMENT
pt. is aaox4, with extensive medical hx. states she wants to go to detox. iv access obtained via US. no acute resp distress noted. safety maintained. denies HI, SI. states her last drink was the night before.

## 2025-05-07 NOTE — ED BEHAVIORAL HEALTH ASSESSMENT NOTE - SUMMARY
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 The pt reports feeling depressed and anxious for months, but has felt acutely distressed over the last 1-2 days by recent events. She now self-presents requesting connection to inpt detox/rehab for alcohol misuse and to outpt psychiatric care.    Given the pt's reported hx and current exam, her presentation is c/f Adjustment Disorder with mixed fx vs Bipolar Disorder, current episode depressed/mixed vs MDD. The pt poses a chronic risk of harm given her reported psych hx, legal issues, homelessness, substance use, stress tied to recent CPS involvement, and lack of connection to care. 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 She reports feeling depressed and anxious for months in the setting of these stressors, but felt acutely distressed over the last 1-2 days given her recent conflict with parents, homelessness, and incarceration. She now self-presents requesting connection to inpt detox/rehab for alcohol misuse and to outpt psychiatric care.    Given the pt's reported hx and current exam, her presentation is c/f Adjustment Disorder with mixed fx vs Bipolar Disorder, current episode depressed/mixed vs MDD. She poses a chronic risk of harm given her reported psych hx, legal issues, homelessness, substance use, stress tied to recent CPS involvement, medical issues, and lack of connection to care. However, her current risk is mitigated by the fact she consistently denies SI with intent or plan, presents as help-seeking/future-oriented, denies pSA, denies recent NSSIB/physical aggression, denies access to firearms, conveys motivation toward becoming sober, is able to safety plan, cites reasons to live, and has remained in good behavioral control. In light of these mitigating/protective factors, the pt does not currently present as an imminent risk of harm to self and does not meet criteria for involuntary psychiatric hospitalization. Instead, her risk was further mitigated by engaging her in safety planning, providing her outpt psychiatric referrals, recommending a  consultation to assist with connection to detox/rehab and possibly with housing, and instructing the pt to return to the ED for acute safety concerns. The pt is psychiatrically cleared for discharge.

## 2025-05-08 LAB
CULTURE RESULTS: SIGNIFICANT CHANGE UP
SPECIMEN SOURCE: SIGNIFICANT CHANGE UP